# Patient Record
Sex: FEMALE | Race: WHITE | NOT HISPANIC OR LATINO | Employment: FULL TIME | URBAN - NONMETROPOLITAN AREA
[De-identification: names, ages, dates, MRNs, and addresses within clinical notes are randomized per-mention and may not be internally consistent; named-entity substitution may affect disease eponyms.]

---

## 2024-01-29 ENCOUNTER — TELEPHONE (OUTPATIENT)
Dept: PRIMARY CARE | Facility: CLINIC | Age: 64
End: 2024-01-29

## 2024-01-29 NOTE — TELEPHONE ENCOUNTER
Patient called wanting a referral for dry needling. I advised this patient that she would need to be seen for a referral and that you have not seen her in over 3 years so she would have to re establish as a new patient and you are not taking new patients at this time. Patient was demanding on wanting to see you again and getting a referral. I advised patient that I will send you a message but you are out of office until tomorrow. Please advise.

## 2024-02-02 NOTE — TELEPHONE ENCOUNTER
Patient is calling again regarding this matter, please advise she is persistent on seeing you and I again explained that unfortunately she hasn't been seen in 3 years and would be considered a new patient which you are not taking at this time.

## 2024-02-26 ENCOUNTER — OFFICE VISIT (OUTPATIENT)
Dept: PRIMARY CARE | Facility: CLINIC | Age: 64
End: 2024-02-26
Payer: COMMERCIAL

## 2024-02-26 VITALS
DIASTOLIC BLOOD PRESSURE: 73 MMHG | RESPIRATION RATE: 12 BRPM | SYSTOLIC BLOOD PRESSURE: 121 MMHG | BODY MASS INDEX: 22.78 KG/M2 | TEMPERATURE: 97.4 F | OXYGEN SATURATION: 98 % | WEIGHT: 159.1 LBS | HEIGHT: 70 IN | HEART RATE: 63 BPM

## 2024-02-26 DIAGNOSIS — R20.2 PARESTHESIAS IN LEFT HAND: Primary | ICD-10-CM

## 2024-02-26 DIAGNOSIS — Z00.00 ROUTINE HEALTH MAINTENANCE: ICD-10-CM

## 2024-02-26 DIAGNOSIS — M62.838 MUSCLE SPASMS OF NECK: ICD-10-CM

## 2024-02-26 PROBLEM — A60.1 HERPES SIMPLEX INFECTION OF PERIANAL SKIN: Status: ACTIVE | Noted: 2022-11-07

## 2024-02-26 PROBLEM — Z86.19 H/O HERPES GENITALIS: Status: ACTIVE | Noted: 2022-11-07

## 2024-02-26 PROBLEM — R00.2 HEART PALPITATIONS: Status: ACTIVE | Noted: 2024-02-26

## 2024-02-26 PROBLEM — N95.2 VAGINAL ATROPHY: Status: ACTIVE | Noted: 2021-10-15

## 2024-02-26 PROBLEM — E78.5 HYPERLIPIDEMIA: Status: ACTIVE | Noted: 2024-02-26

## 2024-02-26 PROBLEM — L63.9 ALOPECIA AREATA: Status: ACTIVE | Noted: 2024-02-26

## 2024-02-26 PROCEDURE — 99203 OFFICE O/P NEW LOW 30 MIN: CPT | Performed by: FAMILY MEDICINE

## 2024-02-26 PROCEDURE — 1036F TOBACCO NON-USER: CPT | Performed by: FAMILY MEDICINE

## 2024-02-26 RX ORDER — VALACYCLOVIR HYDROCHLORIDE 500 MG/1
500 TABLET, FILM COATED ORAL 2 TIMES DAILY
COMMUNITY
Start: 2023-08-15 | End: 2024-03-26 | Stop reason: WASHOUT

## 2024-02-26 RX ORDER — METHOCARBAMOL 500 MG/1
500 TABLET, FILM COATED ORAL 3 TIMES DAILY
Qty: 15 TABLET | Refills: 0 | Status: SHIPPED | OUTPATIENT
Start: 2024-02-26 | End: 2024-03-26 | Stop reason: ALTCHOICE

## 2024-02-26 ASSESSMENT — PATIENT HEALTH QUESTIONNAIRE - PHQ9
1. LITTLE INTEREST OR PLEASURE IN DOING THINGS: NOT AT ALL
2. FEELING DOWN, DEPRESSED OR HOPELESS: NOT AT ALL
SUM OF ALL RESPONSES TO PHQ9 QUESTIONS 1 AND 2: 0

## 2024-02-26 NOTE — PROGRESS NOTES
"Subjective   Patient ID: Frances Cook is a 63 y.o. female who presents for arm tingling  and back tingling.    HPI   Located on LEFT side and intermittent  Also has upper thoracic back pain  Did PT and dry needling, did cupping which worsened the pain  Has paresthesias if neck is in a certain position, worse with flexion  Has to sleep on back  Exacerbated by driving  Denies weakness  2nd and 3rd fingers are constantly numb  Xray cervical (2017)-IMPRESSION:  Mild discogenic degenerative changes. No visible acute process.  Minimal grade 1 spondylolisthesis at C5-C6.  Takes aleve, but only minimally helping    Review of Systems  See HPI    Objective   /73 (BP Location: Left arm, Patient Position: Sitting, BP Cuff Size: Adult)   Pulse 63   Temp 36.3 °C (97.4 °F) (Temporal)   Resp 12   Ht 1.772 m (5' 9.75\")   Wt 72.2 kg (159 lb 1.6 oz)   SpO2 98%   BMI 22.99 kg/m²     Physical Exam  Constitutional: Well developed, well nourished, alert and in no acute distress   Eyes: Normal external exam.   Neck: Supple, no lymphadenopathy or masses. NTTP along vertebral spinous processes. +muscle spasm bilateral trapezius muscles R>L, no suboccipital TTP, Pain with extension and right rotation, +right sided spurling exam.  Cardiovascular: Regular rate and rhythm, normal S1 and S2, no murmurs, gallops, or rubs. Radial pulses normal. No peripheral edema.  Pulmonary: No respiratory distress, lungs clear to auscultation bilaterally. No wheezes, rhonchi, rales.  Skin: Warm, well perfused, normal skin turgor and color.   Neurologic: Cranial nerves II-XII grossly intact.   Psychiatric: Mood calm and affect normal.    Assessment/Plan   Trial Robaxin muscle relaxer at night time, but you may take every 8 hours as needed.  Do not drive after taking this medication as it may cause drowsiness.    Continue home neck stretches and exercises    2017 xray cervical reviewed     May consider gabapentin and imaging if not improving.    We " discussed that if you develop weakness you need to contact us immediately.    Follow up with PCP in 4 weeks          1.75

## 2024-02-29 ENCOUNTER — TELEPHONE (OUTPATIENT)
Dept: PRIMARY CARE | Facility: CLINIC | Age: 64
End: 2024-02-29
Payer: COMMERCIAL

## 2024-02-29 DIAGNOSIS — M54.12 CERVICAL RADICULOPATHY: Primary | ICD-10-CM

## 2024-02-29 RX ORDER — GABAPENTIN 100 MG/1
100 CAPSULE ORAL NIGHTLY
Qty: 30 CAPSULE | Refills: 0 | Status: SHIPPED | OUTPATIENT
Start: 2024-02-29 | End: 2024-03-26 | Stop reason: SDUPTHER

## 2024-02-29 NOTE — TELEPHONE ENCOUNTER
I have sent in a low dose gabapentin for her to trial at night time, will need to follow up with  for further refills on this

## 2024-02-29 NOTE — TELEPHONE ENCOUNTER
Pt was to call and give you an update on the Robaxin 500 mg she said she still has numbness in her hand and arm, not as bad but she has to watch how she uses it because she will still get pain. She has enough pills till Sat but wanted to know your thoughts on the gabapentin you spoke about.   Please advise refill vs change to gabapentin or can she take both?

## 2024-03-14 ENCOUNTER — LAB (OUTPATIENT)
Dept: LAB | Facility: LAB | Age: 64
End: 2024-03-14
Payer: COMMERCIAL

## 2024-03-14 ENCOUNTER — APPOINTMENT (OUTPATIENT)
Dept: LAB | Facility: LAB | Age: 64
End: 2024-03-14
Payer: COMMERCIAL

## 2024-03-14 DIAGNOSIS — Z00.00 ROUTINE HEALTH MAINTENANCE: ICD-10-CM

## 2024-03-14 PROCEDURE — 83036 HEMOGLOBIN GLYCOSYLATED A1C: CPT

## 2024-03-14 PROCEDURE — 80061 LIPID PANEL: CPT

## 2024-03-14 PROCEDURE — 36415 COLL VENOUS BLD VENIPUNCTURE: CPT

## 2024-03-14 PROCEDURE — 80053 COMPREHEN METABOLIC PANEL: CPT

## 2024-03-14 PROCEDURE — 85027 COMPLETE CBC AUTOMATED: CPT

## 2024-03-15 ENCOUNTER — APPOINTMENT (OUTPATIENT)
Dept: PRIMARY CARE | Facility: CLINIC | Age: 64
End: 2024-03-15
Payer: COMMERCIAL

## 2024-03-15 LAB
ALBUMIN SERPL BCP-MCNC: 4.5 G/DL (ref 3.4–5)
ALP SERPL-CCNC: 84 U/L (ref 33–136)
ALT SERPL W P-5'-P-CCNC: 15 U/L (ref 7–45)
ANION GAP SERPL CALC-SCNC: 11 MMOL/L (ref 10–20)
AST SERPL W P-5'-P-CCNC: 18 U/L (ref 9–39)
BILIRUB SERPL-MCNC: 0.7 MG/DL (ref 0–1.2)
BUN SERPL-MCNC: 16 MG/DL (ref 6–23)
CALCIUM SERPL-MCNC: 9.6 MG/DL (ref 8.6–10.6)
CHLORIDE SERPL-SCNC: 105 MMOL/L (ref 98–107)
CHOLEST SERPL-MCNC: 239 MG/DL (ref 0–199)
CHOLESTEROL/HDL RATIO: 4.6
CO2 SERPL-SCNC: 31 MMOL/L (ref 21–32)
CREAT SERPL-MCNC: 0.99 MG/DL (ref 0.5–1.05)
EGFRCR SERPLBLD CKD-EPI 2021: 64 ML/MIN/1.73M*2
ERYTHROCYTE [DISTWIDTH] IN BLOOD BY AUTOMATED COUNT: 12.2 % (ref 11.5–14.5)
EST. AVERAGE GLUCOSE BLD GHB EST-MCNC: 111 MG/DL
GLUCOSE SERPL-MCNC: 93 MG/DL (ref 74–99)
HBA1C MFR BLD: 5.5 %
HCT VFR BLD AUTO: 38.1 % (ref 36–46)
HDLC SERPL-MCNC: 52 MG/DL
HGB BLD-MCNC: 12.1 G/DL (ref 12–16)
LDLC SERPL CALC-MCNC: 168 MG/DL
MCH RBC QN AUTO: 28.4 PG (ref 26–34)
MCHC RBC AUTO-ENTMCNC: 31.8 G/DL (ref 32–36)
MCV RBC AUTO: 89 FL (ref 80–100)
NON HDL CHOLESTEROL: 187 MG/DL (ref 0–149)
NRBC BLD-RTO: 0 /100 WBCS (ref 0–0)
PLATELET # BLD AUTO: 291 X10*3/UL (ref 150–450)
POTASSIUM SERPL-SCNC: 4.8 MMOL/L (ref 3.5–5.3)
PROT SERPL-MCNC: 7.2 G/DL (ref 6.4–8.2)
RBC # BLD AUTO: 4.26 X10*6/UL (ref 4–5.2)
SODIUM SERPL-SCNC: 142 MMOL/L (ref 136–145)
TRIGL SERPL-MCNC: 94 MG/DL (ref 0–149)
VLDL: 19 MG/DL (ref 0–40)
WBC # BLD AUTO: 5.7 X10*3/UL (ref 4.4–11.3)

## 2024-03-19 ASSESSMENT — PROMIS GLOBAL HEALTH SCALE
CARRYOUT_SOCIAL_ACTIVITIES: EXCELLENT
RATE_PHYSICAL_HEALTH: VERY GOOD
RATE_MENTAL_HEALTH: VERY GOOD
EMOTIONAL_PROBLEMS: NEVER
CARRYOUT_PHYSICAL_ACTIVITIES: COMPLETELY
RATE_AVERAGE_PAIN: 2
RATE_GENERAL_HEALTH: EXCELLENT
RATE_QUALITY_OF_LIFE: EXCELLENT
RATE_SOCIAL_SATISFACTION: VERY GOOD

## 2024-03-26 ENCOUNTER — OFFICE VISIT (OUTPATIENT)
Dept: PRIMARY CARE | Facility: CLINIC | Age: 64
End: 2024-03-26
Payer: COMMERCIAL

## 2024-03-26 ENCOUNTER — HOSPITAL ENCOUNTER (OUTPATIENT)
Dept: RADIOLOGY | Facility: EXTERNAL LOCATION | Age: 64
Discharge: HOME | End: 2024-03-26

## 2024-03-26 VITALS
TEMPERATURE: 98.2 F | HEIGHT: 70 IN | SYSTOLIC BLOOD PRESSURE: 115 MMHG | HEART RATE: 61 BPM | WEIGHT: 156.6 LBS | DIASTOLIC BLOOD PRESSURE: 75 MMHG | OXYGEN SATURATION: 94 % | BODY MASS INDEX: 22.42 KG/M2

## 2024-03-26 DIAGNOSIS — Z00.00 HEALTH CARE MAINTENANCE: ICD-10-CM

## 2024-03-26 DIAGNOSIS — M54.12 CERVICAL RADICULOPATHY: ICD-10-CM

## 2024-03-26 DIAGNOSIS — E78.49 OTHER HYPERLIPIDEMIA: ICD-10-CM

## 2024-03-26 DIAGNOSIS — Z12.11 SCREENING FOR MALIGNANT NEOPLASM OF COLON: Primary | ICD-10-CM

## 2024-03-26 PROCEDURE — 1036F TOBACCO NON-USER: CPT | Performed by: FAMILY MEDICINE

## 2024-03-26 PROCEDURE — 99396 PREV VISIT EST AGE 40-64: CPT | Performed by: FAMILY MEDICINE

## 2024-03-26 RX ORDER — CLOBETASOL PROPIONATE 0.5 MG/G
OINTMENT TOPICAL
COMMUNITY
Start: 2024-03-14

## 2024-03-26 RX ORDER — GABAPENTIN 100 MG/1
CAPSULE ORAL
Qty: 90 CAPSULE | Refills: 0 | Status: SHIPPED | OUTPATIENT
Start: 2024-03-26 | End: 2024-05-22 | Stop reason: SDUPTHER

## 2024-03-26 NOTE — PROGRESS NOTES
"Subjective   Patient ID: Frances Cook is a 63 y.o. female who presents for CPE (Pt states she is actually here to follow up on a neck problem that she saw Dr. Cardenas about 6 weeks ago for problem. She hasn't had a physical in 1 year and it was in [place]. She is willing to do a annual today. ).    HPI   Frances was seen today for a routine wellness exam as well as to review her left-sided cervical radiculopathy.  She was seen by my partner several weeks ago, treated with gabapentin 100 mg nightly which has helped some.  She tolerates it well, sleeps a little better.  Her main symptoms are second and third finger neuropathy and pain, she really has no neck pain, nor weakness.  She denies any injury, symptoms started in January.  X-ray 2017 showed some degenerative changes, trace spondylolisthesis.  Labs were recently checked, all reassuring except for her hyperlipidemia.  LDL was 168, HDL 52.  These numbers have improved slightly.  Tdap is up-to-date  She has yet to have a colonoscopy.  Gynecology care is up-to-date, including Pap smear and mammogram.  Review of Systems  The full, 10+ multi-organ review of systems, is within normal limits with the exception of what is noted above in HPI.  Objective   /75 (BP Location: Left arm, Patient Position: Sitting, BP Cuff Size: Adult)   Pulse 61   Temp 36.8 °C (98.2 °F) (Temporal)   Ht 1.765 m (5' 9.5\")   Wt 71 kg (156 lb 9.6 oz)   SpO2 94%   BMI 22.79 kg/m²     Physical Exam  Constitutional/General appearance: alert, oriented, well-appearing, in no distress  Head and face exam is normal  No scleral icterus or conjunctival erythema present  Hearing is grossly normal  Respiratory effort is normal, no dyspnea noted  Cortical function is normal  Mood, affect, are pleasant, appropriate, and interactive.  Insight is normal  Cardiac exam reveals a regular rate and rhythm, no murmurs, rubs or gallops present.   Lungs are clear bilaterally.    No lower extremity edema " present.  Bilateral upper extremity strength and range of motion are within normal limits.  No pain reproduced with full flexion and extension.  Spurling's sign is positive on the left, negative on the right.  Deep tendon reflexes are within normal limits.  Assessment/Plan     Today your wellness care was reviewed.  Please keep up your vision and dental care.  Focus on lifestyle efforts, including a goal of 30 minutes of exercise 5 days/week.    A healthy diet rich in fruits, vegetables, and lean proteins encouraged.  Healthy fats, such as can be found in nuts, olives, avocados are encouraged (unless allergies prohibit).  Avoid/minimize junk and fast food    For left-sided cervical radiculopathy, I have ordered an x-ray, physical therapy, and discussed increasing gabapentin, new prescription sent.  Should symptoms worsen or not improve sufficiently with conservative treatment, an MRI could be considered.    Colonoscopy ordered, via the [facility] is up-to-date    Follow-up annually, sooner if needed  **Portions of this medical record have been created using voice recognition software and may have minor errors which are inherent in voice recognition systems. It has not been fully edited for typographical or grammatical errors**

## 2024-03-27 ENCOUNTER — TELEPHONE (OUTPATIENT)
Dept: PRIMARY CARE | Facility: CLINIC | Age: 64
End: 2024-03-27

## 2024-03-27 DIAGNOSIS — M54.12 CERVICAL RADICULOPATHY: ICD-10-CM

## 2024-03-27 RX ORDER — GABAPENTIN 100 MG/1
CAPSULE ORAL
Qty: 90 CAPSULE | Refills: 0 | OUTPATIENT
Start: 2024-03-27

## 2024-03-27 NOTE — TELEPHONE ENCOUNTER
Pt called because she can't get her Gabapentin.  She was advised by [facility] that she would have to wait 30 days to get the script that was sent yesterday. [facility] changed the dosage on this medication. Can someone call [facility] and see what we can do for this pt.

## 2024-03-27 NOTE — TELEPHONE ENCOUNTER
Spoke with pharmacy, they did not realize it was changed, they spoke with the patient already and they are getting it ready for you

## 2024-05-22 ENCOUNTER — TELEPHONE (OUTPATIENT)
Dept: PRIMARY CARE | Facility: CLINIC | Age: 64
End: 2024-05-22
Payer: COMMERCIAL

## 2024-05-22 DIAGNOSIS — M54.12 CERVICAL RADICULOPATHY: ICD-10-CM

## 2024-05-23 RX ORDER — GABAPENTIN 100 MG/1
CAPSULE ORAL
Qty: 90 CAPSULE | Refills: 1 | Status: SHIPPED | OUTPATIENT
Start: 2024-05-23

## 2024-05-23 NOTE — TELEPHONE ENCOUNTER
Gabapentin refill request received, please check to see how she is taking it now so I can refill it appropriately, or if it needs adjusted further

## 2024-08-06 ENCOUNTER — APPOINTMENT (OUTPATIENT)
Dept: PRIMARY CARE | Facility: CLINIC | Age: 64
End: 2024-08-06
Payer: COMMERCIAL

## 2024-08-06 VITALS
RESPIRATION RATE: 14 BRPM | BODY MASS INDEX: 21.86 KG/M2 | HEART RATE: 55 BPM | SYSTOLIC BLOOD PRESSURE: 113 MMHG | TEMPERATURE: 98.3 F | WEIGHT: 150.2 LBS | OXYGEN SATURATION: 98 % | DIASTOLIC BLOOD PRESSURE: 64 MMHG

## 2024-08-06 DIAGNOSIS — M25.511 CHRONIC RIGHT SHOULDER PAIN: Primary | ICD-10-CM

## 2024-08-06 DIAGNOSIS — G89.29 CHRONIC RIGHT SHOULDER PAIN: Primary | ICD-10-CM

## 2024-08-06 PROCEDURE — 1036F TOBACCO NON-USER: CPT | Performed by: FAMILY MEDICINE

## 2024-08-06 PROCEDURE — 99213 OFFICE O/P EST LOW 20 MIN: CPT | Performed by: FAMILY MEDICINE

## 2024-08-06 RX ORDER — VALACYCLOVIR HYDROCHLORIDE 500 MG/1
500 TABLET, FILM COATED ORAL DAILY
COMMUNITY

## 2024-08-06 ASSESSMENT — PAIN SCALES - GENERAL: PAINLEVEL: 7

## 2024-08-06 ASSESSMENT — ENCOUNTER SYMPTOMS: ARTHRALGIAS: 1

## 2024-08-06 NOTE — PROGRESS NOTES
Subjective   Patient ID: Frances Cook is a 63 y.o. female who presents for R shoulder pain .    HPI   X 1 year ago intermittently, recently worse (uncertain if she injured this shoulder 10+ years ago)-frozen shoulder on right  No CRISTHIAN  She is ambidextrous  No numbness/tingling,  no weakness  Worse with reaching backwards, internal rotation  +TTP at AC joint  Taking aleve for pain, which helps (she is able to sleep)  She had a cortisone shot in the past, saw Ortho for this ()  Did PT for frozen shoulder for several months  No pain at rest    Review of Systems   Musculoskeletal:  Positive for arthralgias.   All other systems reviewed and are negative.    Objective   /64 (BP Location: Left arm, Patient Position: Sitting, BP Cuff Size: Adult)   Pulse 55   Temp 36.8 °C (98.3 °F) (Temporal)   Resp 14   Wt 68.1 kg (150 lb 3.2 oz)   SpO2 98%   BMI 21.86 kg/m²     Physical Exam  Musculoskeletal:      Right shoulder: Bony tenderness and crepitus present. No swelling or tenderness. Decreased range of motion. Normal strength. Normal pulse.      Comments: Discomfort with abduction, internal rotation, flexion.   Normal strength to resisted ROM  +TTP AC joint  +muscle spasm right trapezius       Constitutional: Well developed, well nourished, alert and in no acute distress   Eyes: Normal external exam.  Neck: Supple, no lymphadenopathy or masses.   Cardiovascular: No peripheral edema.  Pulmonary: No respiratory distress  Skin: Warm, well perfused, normal skin turgor and color.   Neurologic: Cranial nerves II-XII grossly intact.   Psychiatric: Mood calm and affect normal.    Assessment/Plan   Referral to PT/OT    ACTIV -[facility] [place]    Continue gentle range of motion exercises

## 2024-08-26 ENCOUNTER — TELEPHONE (OUTPATIENT)
Dept: PRIMARY CARE | Facility: CLINIC | Age: 64
End: 2024-08-26
Payer: COMMERCIAL

## 2024-08-26 DIAGNOSIS — M25.519 SHOULDER PAIN, UNSPECIFIED CHRONICITY, UNSPECIFIED LATERALITY: Primary | ICD-10-CM

## 2024-08-26 NOTE — TELEPHONE ENCOUNTER
Patient is asking for referral for [facility] ortho for a right shoulder Cortizone injection

## 2025-05-23 ENCOUNTER — TELEPHONE (OUTPATIENT)
Dept: PRIMARY CARE | Facility: CLINIC | Age: 65
End: 2025-05-23
Payer: COMMERCIAL

## 2025-05-23 DIAGNOSIS — Z13.220 SCREENING, LIPID: ICD-10-CM

## 2025-05-23 DIAGNOSIS — Z13.1 SCREENING FOR DIABETES MELLITUS: ICD-10-CM

## 2025-05-23 DIAGNOSIS — M54.12 CERVICAL RADICULOPATHY: ICD-10-CM

## 2025-05-23 DIAGNOSIS — Z13.0 SCREENING, ANEMIA, DEFICIENCY, IRON: Primary | ICD-10-CM

## 2025-05-23 NOTE — TELEPHONE ENCOUNTER
Patient called in and used to be a patient of . Patient made an appt on 8.21.25 and was wondering if she could have her lab work order in before her appt.       Please and thank you.

## 2025-05-26 NOTE — TELEPHONE ENCOUNTER
Labwork ordered    Pls remind pt about [facility] and making appt . Looks likes it been > a year since she had labwork done there.  shubham

## 2025-05-27 RX ORDER — GABAPENTIN 100 MG/1
100 CAPSULE ORAL 3 TIMES DAILY
Qty: 90 CAPSULE | Refills: 0 | Status: SHIPPED | OUTPATIENT
Start: 2025-05-27 | End: 2025-11-23

## 2025-05-27 NOTE — TELEPHONE ENCOUNTER
I dont see Gabapentin on her Current Med List.   Pls verify what strength it is  ,  and how many times a day she takes it. Pls verify the pharmacy she wants the refill to go to [facility]

## 2025-06-15 ASSESSMENT — PROMIS GLOBAL HEALTH SCALE
RATE_GENERAL_HEALTH: EXCELLENT
RATE_MENTAL_HEALTH: EXCELLENT
RATE_AVERAGE_PAIN: 2
RATE_SOCIAL_SATISFACTION: EXCELLENT
RATE_QUALITY_OF_LIFE: VERY GOOD
EMOTIONAL_PROBLEMS: NEVER
CARRYOUT_PHYSICAL_ACTIVITIES: COMPLETELY
RATE_PHYSICAL_HEALTH: GOOD
CARRYOUT_SOCIAL_ACTIVITIES: EXCELLENT

## 2025-06-17 LAB
ALBUMIN SERPL-MCNC: 4.8 G/DL (ref 3.6–5.1)
ALP SERPL-CCNC: 63 U/L (ref 37–153)
ALT SERPL-CCNC: 11 U/L (ref 6–29)
ANION GAP SERPL CALCULATED.4IONS-SCNC: 8 MMOL/L (CALC) (ref 7–17)
AST SERPL-CCNC: 18 U/L (ref 10–35)
BASOPHILS # BLD AUTO: 50 CELLS/UL (ref 0–200)
BASOPHILS NFR BLD AUTO: 1 %
BILIRUB SERPL-MCNC: 0.8 MG/DL (ref 0.2–1.2)
BUN SERPL-MCNC: 17 MG/DL (ref 7–25)
CALCIUM SERPL-MCNC: 9.3 MG/DL (ref 8.6–10.4)
CHLORIDE SERPL-SCNC: 104 MMOL/L (ref 98–110)
CHOLEST SERPL-MCNC: 244 MG/DL
CHOLEST/HDLC SERPL: 3.4 (CALC)
CO2 SERPL-SCNC: 28 MMOL/L (ref 20–32)
CREAT SERPL-MCNC: 0.93 MG/DL (ref 0.5–1.05)
EGFRCR SERPLBLD CKD-EPI 2021: 68 ML/MIN/1.73M2
EOSINOPHIL # BLD AUTO: 70 CELLS/UL (ref 15–500)
EOSINOPHIL NFR BLD AUTO: 1.4 %
ERYTHROCYTE [DISTWIDTH] IN BLOOD BY AUTOMATED COUNT: 12 % (ref 11–15)
GLUCOSE SERPL-MCNC: 88 MG/DL (ref 65–99)
HCT VFR BLD AUTO: 38.7 % (ref 35–45)
HDLC SERPL-MCNC: 72 MG/DL
HGB BLD-MCNC: 12.6 G/DL (ref 11.7–15.5)
LDLC SERPL CALC-MCNC: 153 MG/DL (CALC)
LYMPHOCYTES # BLD AUTO: 2020 CELLS/UL (ref 850–3900)
LYMPHOCYTES NFR BLD AUTO: 40.4 %
MCH RBC QN AUTO: 28.8 PG (ref 27–33)
MCHC RBC AUTO-ENTMCNC: 32.6 G/DL (ref 32–36)
MCV RBC AUTO: 88.4 FL (ref 80–100)
MONOCYTES # BLD AUTO: 300 CELLS/UL (ref 200–950)
MONOCYTES NFR BLD AUTO: 6 %
NEUTROPHILS # BLD AUTO: 2560 CELLS/UL (ref 1500–7800)
NEUTROPHILS NFR BLD AUTO: 51.2 %
NONHDLC SERPL-MCNC: 172 MG/DL (CALC)
PLATELET # BLD AUTO: 264 THOUSAND/UL (ref 140–400)
PMV BLD REES-ECKER: 10.6 FL (ref 7.5–12.5)
POTASSIUM SERPL-SCNC: 4.2 MMOL/L (ref 3.5–5.3)
PROT SERPL-MCNC: 7.4 G/DL (ref 6.1–8.1)
RBC # BLD AUTO: 4.38 MILLION/UL (ref 3.8–5.1)
SODIUM SERPL-SCNC: 140 MMOL/L (ref 135–146)
TRIGL SERPL-MCNC: 83 MG/DL
WBC # BLD AUTO: 5 THOUSAND/UL (ref 3.8–10.8)

## 2025-06-19 ENCOUNTER — APPOINTMENT (OUTPATIENT)
Dept: PRIMARY CARE | Facility: CLINIC | Age: 65
End: 2025-06-19
Payer: MEDICARE

## 2025-06-19 VITALS
BODY MASS INDEX: 21.62 KG/M2 | DIASTOLIC BLOOD PRESSURE: 58 MMHG | SYSTOLIC BLOOD PRESSURE: 108 MMHG | TEMPERATURE: 97.5 F | WEIGHT: 151 LBS | HEIGHT: 70 IN | OXYGEN SATURATION: 97 % | RESPIRATION RATE: 16 BRPM | HEART RATE: 61 BPM

## 2025-06-19 DIAGNOSIS — Z78.0 ASYMPTOMATIC MENOPAUSE: ICD-10-CM

## 2025-06-19 DIAGNOSIS — E78.49 OTHER HYPERLIPIDEMIA: ICD-10-CM

## 2025-06-19 DIAGNOSIS — Z12.11 COLON CANCER SCREENING: ICD-10-CM

## 2025-06-19 DIAGNOSIS — M72.2 PLANTAR FASCIITIS OF RIGHT FOOT: ICD-10-CM

## 2025-06-19 DIAGNOSIS — Z00.00 HEALTH CARE MAINTENANCE: Primary | ICD-10-CM

## 2025-06-19 DIAGNOSIS — M75.01 ADHESIVE CAPSULITIS OF RIGHT SHOULDER: ICD-10-CM

## 2025-06-19 PROBLEM — M25.511 RIGHT ANTERIOR SHOULDER PAIN: Status: ACTIVE | Noted: 2025-06-19

## 2025-06-19 PROBLEM — M25.511 RIGHT ANTERIOR SHOULDER PAIN: Status: RESOLVED | Noted: 2025-06-19 | Resolved: 2025-06-19

## 2025-06-19 PROBLEM — A60.1 HERPES SIMPLEX INFECTION OF PERIANAL SKIN: Status: RESOLVED | Noted: 2022-11-07 | Resolved: 2025-06-19

## 2025-06-19 PROCEDURE — 1159F MED LIST DOCD IN RCRD: CPT | Performed by: FAMILY MEDICINE

## 2025-06-19 PROCEDURE — 3008F BODY MASS INDEX DOCD: CPT | Performed by: FAMILY MEDICINE

## 2025-06-19 PROCEDURE — 1160F RVW MEDS BY RX/DR IN RCRD: CPT | Performed by: FAMILY MEDICINE

## 2025-06-19 PROCEDURE — 90732 PPSV23 VACC 2 YRS+ SUBQ/IM: CPT | Performed by: FAMILY MEDICINE

## 2025-06-19 PROCEDURE — 99397 PER PM REEVAL EST PAT 65+ YR: CPT | Performed by: FAMILY MEDICINE

## 2025-06-19 PROCEDURE — 1036F TOBACCO NON-USER: CPT | Performed by: FAMILY MEDICINE

## 2025-06-19 PROCEDURE — G0009 ADMIN PNEUMOCOCCAL VACCINE: HCPCS | Performed by: FAMILY MEDICINE

## 2025-06-19 NOTE — PROGRESS NOTES
" Subjective   Patient ID: Gogo Cook \"Isael" is a 64 y.o. female who presents for Annual Exam.  CPE ,  new to me, last visit with PCP approx a year , Hx Hyperlipidemia.   , other labs ok  . Here with  6 yr old Rocky reyna .    Has 2 sons who are Nephrologists at [employer] .   Has 2 dtrs in law , 1 is a Primary Care doctor at [employer], the other is an Oncology Surgeon    She is a retired Dental Hygienist   Patient Active Problem List   Diagnosis    H/O herpes genitalis    Hyperlipidemia    Vaginal atrophy    Plantar fasciitis of right foot     Current Outpatient Medications   Medication Sig Dispense Refill    clobetasol (Temovate) 0.05 % ointment       gabapentin (Neurontin) 100 mg capsule Take 1 capsule (100 mg) by mouth 3 times a day. 90 capsule 0    valACYclovir (Valtrex) 500 mg tablet Take 1 tablet (500 mg) by mouth once daily.       No current facility-administered medications for this visit.       History of Present Illness  The patient presents for evaluation of right frozen shoulder and right plantar fasciitis.  Right Frozen Shoulder  - Recurrence of previously rehabilitated right frozen shoulder  - Attributes recurrence to recent increase in physical activity following detention  - Current symptoms are approximately one-third as severe as the initial episode  - Limiting ability to perform certain activities  - Considering another injection, having found relief from a similar treatment at [facility] in the past  - An x-ray was performed prior to the previous injection  - Onset of current episode was in 07/2024  - Believes it may have been triggered by workout routine  - Range of motion is compromised  - Occasionally experiences pain  - Underwent physical therapy and continues to perform some exercises at home  - Suspects some exercises may be exacerbating condition  - Ambidextrous  - Had a similar issue with left shoulder 15 years ago, which remained asymptomatic until last year    Right Plantar " Fasciitis  - Dealing with right plantar fasciitis since 01/2025  - Exacerbated by winter pickleball games  - Approximately 3 weeks ago, experienced a popping sensation in heel while stepping forward during a game  - Rendered unable to walk off the court  - Managing condition with ice and a boot  - Boot has caused additional pain due to pressure on sides of heel  - Reports burning sensation in heel and morning stiffness, which gradually improves throughout the day  - Eager to resume pickleball games and running  - Seeking advice on whether these activities could further aggravate condition  - Performing stretching exercises and applying ice to foot  - Has not sought medical attention for injury  - Daughter-in-law, a healthcare professional, has examined it and confirmed diagnosis of plantar fasciitis  - Taking Aleve every 12 hours for the past 2 weeks    Supplemental information: She has never had a bone density test. She is due for a mammogram next spring. She had blood work done recently. She is interested in doing Cologuard instead of colonoscopy. She has never had the pneumococcal vaccine. She will do the influenza vaccine in the fall. She has not had a COVID-19 vaccine for a while. She is not around the public anymore. She had MMR as a child. She had her tetanus when her grandson was born.She sees an eye doctor and dentist regularly. She reports no hearing loss or vertigo. She reports no problems with high blood pressure in the past. She reports no constipation, diarrhea, ulcers, pancreatitis, or gallstones. She reports no urinary issues like stones or bladder incontinence. She does not see a dermatologist and reports no skin issues. She reports no bleeding issues or clots. She has a history of cervical radiculopathy and has been taking gabapentin for it, but she does not have that problem anymore. She gets cold sores or herpes outbreaks periodically and takes Valtrex for it.    SOCIAL HISTORY  She was a dental  "hygienist.    FAMILY HISTORY  Her mother had pancreatic cancer. Her father had dementia. Her aunt on her mother's side had cancer.      Review of Systems    Objective   /58 (BP Location: Right arm, Patient Position: Sitting, BP Cuff Size: Adult)   Pulse 61   Temp 36.4 °C (97.5 °F) (Temporal)   Resp 16   Ht 1.765 m (5' 9.5\")   Wt 68.5 kg (151 lb)   SpO2 97%   BMI 21.98 kg/m²     Physical Exam  Vitals and nursing note reviewed.   Constitutional:       General: She is not in acute distress.     Appearance: Normal appearance.   Eyes:      Extraocular Movements: Extraocular movements intact.      Conjunctiva/sclera: Conjunctivae normal.      Pupils: Pupils are equal, round, and reactive to light.   Cardiovascular:      Rate and Rhythm: Normal rate and regular rhythm.      Heart sounds: Normal heart sounds.   Pulmonary:      Breath sounds: Normal breath sounds.   Abdominal:      General: Bowel sounds are normal.      Palpations: Abdomen is soft.   Musculoskeletal:      Cervical back: Neck supple.      Right lower leg: No edema.      Left lower leg: No edema.      Comments: Shoulder (R) AROM painful at ends of extension,  flexion , mild shoulder hitch .  Can touch her back at about  L1 .     UE reflex, strength, pulse intact  LE reflex , strength pulse intact   No calf pain.  No pain over heel . No pain over achilles.      Lymphadenopathy:      Cervical: No cervical adenopathy.   Skin:     Findings: No erythema or rash.   Neurological:      General: No focal deficit present.      Mental Status: She is alert and oriented to person, place, and time.   Psychiatric:         Mood and Affect: Mood normal.         Thought Content: Thought content normal.         Judgment: Judgment normal.         Results  Labs   - Cholesterol: Slightly high          Assessment/Plan   Problem List Items Addressed This Visit          Medium    Hyperlipidemia    Plantar fasciitis of right foot    Relevant Orders    XR foot right 1-2 " views     Other Visit Diagnoses         Health care maintenance    -  Primary    Relevant Orders    Follow Up In [facility] - PCP      Asymptomatic menopause        Relevant Orders    XR DEXA bone density      Adhesive capsulitis of right shoulder        Relevant Orders    Referral to Physical Therapy      Colon cancer screening        Relevant Orders    Cologuard® colon cancer screening          Wellness  - preventive care and health maintenance reviewed and discussed   Pneumovax today  Mammogram. Prefers qo year , due in 2026   sees gyn for routine gyn care and orders  CGD ordered  (declines colonoscopy)   DEXA recommended.     Right shoulder capsulitis  - recommend returning to PT  . Printed referral given  See Ortho ([facility]) if not improving     Right pl Fasciitis  - hx of pop , and severe sxs. Recommend xray   Is partly recovered. Continue Rest   See podiatry if not resolved in another 3 wks.      HL , Mild  - monitor yearly       Assessment & Plan  1. Right frozen shoulder  - Symptoms suggest recurrence, potentially exacerbated by current workout regimen  - Physical exam reveals restricted range of motion and tenderness  - Discussed limited effectiveness of injections and manipulation under anesthesia  - Recommended physical therapy  - Advised consultation with orthopedic specialist before considering another injection    2. Right plantar fasciitis  - Symptoms indicative of right plantar fasciitis, potentially complicated by a tear  - Physical exam reveals tenderness and burning sensation in heel, especially in the morning  - Discussed importance of rest, ice, and compression as primary treatment approaches  - Ordered x-ray to assess extent of calcium build-up and any potential avulsion fracture       4. Medication management  - Patient has adequate supply of gabapentin and Valtrex, no refills needed   Keeps gabapentin around just in case.        JIMMY Curran MD    This medical note was created with  the assistance of artificial intelligence (AI) for documentation purposes. The content has been reviewed and confirmed by the healthcare provider for accuracy and completeness. Patient consented to the use of audio recording and use of AI during their visit.

## 2025-06-20 ENCOUNTER — HOSPITAL ENCOUNTER (OUTPATIENT)
Dept: RADIOLOGY | Facility: CLINIC | Age: 65
Discharge: HOME | End: 2025-06-20
Payer: MEDICARE

## 2025-06-20 DIAGNOSIS — M72.2 PLANTAR FASCIITIS OF RIGHT FOOT: ICD-10-CM

## 2025-06-20 PROCEDURE — 73620 X-RAY EXAM OF FOOT: CPT | Mod: RIGHT SIDE | Performed by: RADIOLOGY

## 2025-06-20 PROCEDURE — 73620 X-RAY EXAM OF FOOT: CPT | Mod: RT

## 2025-06-24 ENCOUNTER — HOSPITAL ENCOUNTER (OUTPATIENT)
Dept: RADIOLOGY | Facility: CLINIC | Age: 65
Discharge: HOME | End: 2025-06-24
Payer: MEDICARE

## 2025-06-24 DIAGNOSIS — Z78.0 ASYMPTOMATIC MENOPAUSE: ICD-10-CM

## 2025-06-24 PROCEDURE — 77080 DXA BONE DENSITY AXIAL: CPT

## 2025-06-24 PROCEDURE — 77080 DXA BONE DENSITY AXIAL: CPT | Performed by: STUDENT IN AN ORGANIZED HEALTH CARE EDUCATION/TRAINING PROGRAM

## 2025-06-25 DIAGNOSIS — M54.12 CERVICAL RADICULOPATHY: ICD-10-CM

## 2025-06-25 RX ORDER — GABAPENTIN 100 MG/1
100 CAPSULE ORAL 3 TIMES DAILY
Qty: 90 CAPSULE | Refills: 0 | Status: SHIPPED | OUTPATIENT
Start: 2025-06-25 | End: 2025-12-22

## 2025-06-26 LAB — NONINV COLON CA DNA+OCC BLD SCRN STL QL: NEGATIVE

## 2025-07-23 DIAGNOSIS — M54.12 CERVICAL RADICULOPATHY: ICD-10-CM

## 2025-07-24 RX ORDER — GABAPENTIN 100 MG/1
100 CAPSULE ORAL 3 TIMES DAILY
Qty: 90 CAPSULE | Refills: 0 | Status: SHIPPED | OUTPATIENT
Start: 2025-07-24 | End: 2026-01-20

## 2025-08-21 ENCOUNTER — APPOINTMENT (OUTPATIENT)
Dept: PRIMARY CARE | Facility: CLINIC | Age: 65
End: 2025-08-21
Payer: COMMERCIAL

## 2026-07-23 ENCOUNTER — APPOINTMENT (OUTPATIENT)
Dept: PRIMARY CARE | Facility: CLINIC | Age: 66
End: 2026-07-23
Payer: MEDICARE